# Patient Record
Sex: MALE | Race: WHITE | Employment: FULL TIME | URBAN - METROPOLITAN AREA
[De-identification: names, ages, dates, MRNs, and addresses within clinical notes are randomized per-mention and may not be internally consistent; named-entity substitution may affect disease eponyms.]

---

## 2024-02-01 ENCOUNTER — HOSPITAL ENCOUNTER (OUTPATIENT)
Dept: RADIOLOGY | Facility: HOSPITAL | Age: 49
End: 2024-02-01
Payer: OTHER GOVERNMENT

## 2024-02-12 ENCOUNTER — HOSPITAL ENCOUNTER (OUTPATIENT)
Dept: RADIOLOGY | Facility: HOSPITAL | Age: 49
Discharge: HOME | End: 2024-02-12
Payer: OTHER GOVERNMENT

## 2024-02-12 VITALS
SYSTOLIC BLOOD PRESSURE: 140 MMHG | RESPIRATION RATE: 20 BRPM | HEART RATE: 68 BPM | DIASTOLIC BLOOD PRESSURE: 96 MMHG | OXYGEN SATURATION: 97 %

## 2024-02-12 DIAGNOSIS — I47.20 VENTRICULAR TACHYCARDIA, UNSPECIFIED (MULTI): ICD-10-CM

## 2024-02-12 PROCEDURE — 2500000004 HC RX 250 GENERAL PHARMACY W/ HCPCS (ALT 636 FOR OP/ED): Performed by: STUDENT IN AN ORGANIZED HEALTH CARE EDUCATION/TRAINING PROGRAM

## 2024-02-12 PROCEDURE — 75557 CARDIAC MRI FOR MORPH: CPT | Mod: RSC

## 2024-02-12 RX ORDER — MIDAZOLAM HYDROCHLORIDE 1 MG/ML
0.5 INJECTION, SOLUTION INTRAMUSCULAR; INTRAVENOUS
Status: SHIPPED | OUTPATIENT
Start: 2024-02-12 | End: 2024-02-12

## 2024-02-12 RX ADMIN — MIDAZOLAM 0.5 MG: 1 INJECTION INTRAMUSCULAR; INTRAVENOUS at 10:55

## 2024-02-12 NOTE — NURSING NOTE
1040 Patient received total of Midazolam 2 mg IV for claustrophobia, but is unable to complete MRI scan w/ Midazolam IV sedation; given written instructions, patient will contact PCP to have anesthesia assistance for MRI scan. BL

## 2024-04-05 ENCOUNTER — APPOINTMENT (OUTPATIENT)
Dept: RADIOLOGY | Facility: HOSPITAL | Age: 49
End: 2024-04-05
Payer: OTHER GOVERNMENT

## 2024-04-08 ENCOUNTER — HOSPITAL ENCOUNTER (OUTPATIENT)
Dept: RADIOLOGY | Facility: HOSPITAL | Age: 49
Discharge: HOME | End: 2024-04-08
Payer: OTHER GOVERNMENT

## 2024-04-08 ENCOUNTER — ANESTHESIA (OUTPATIENT)
Dept: RADIOLOGY | Facility: HOSPITAL | Age: 49
End: 2024-04-08
Payer: OTHER GOVERNMENT

## 2024-04-08 ENCOUNTER — ANESTHESIA EVENT (OUTPATIENT)
Dept: RADIOLOGY | Facility: HOSPITAL | Age: 49
End: 2024-04-08
Payer: OTHER GOVERNMENT

## 2024-04-08 VITALS
RESPIRATION RATE: 17 BRPM | TEMPERATURE: 96.8 F | HEART RATE: 72 BPM | DIASTOLIC BLOOD PRESSURE: 82 MMHG | SYSTOLIC BLOOD PRESSURE: 137 MMHG | OXYGEN SATURATION: 96 %

## 2024-04-08 DIAGNOSIS — I47.20 VENTRICULAR TACHYCARDIA, UNSPECIFIED (MULTI): ICD-10-CM

## 2024-04-08 PROBLEM — R07.89 OTHER CHEST PAIN: Status: ACTIVE | Noted: 2024-04-08

## 2024-04-08 PROBLEM — K76.0 FATTY LIVER: Status: ACTIVE | Noted: 2024-04-08

## 2024-04-08 PROBLEM — R94.31 ABNORMAL EKG: Status: ACTIVE | Noted: 2024-04-08

## 2024-04-08 PROBLEM — I63.9 CVA (CEREBRAL VASCULAR ACCIDENT) (MULTI): Status: ACTIVE | Noted: 2024-04-08

## 2024-04-08 PROBLEM — F41.9 ANXIETY: Status: ACTIVE | Noted: 2024-04-08

## 2024-04-08 PROCEDURE — A75561 CHG CARDIAC MRI W/W/O CONTRAST AND FURTHER SEQ

## 2024-04-08 PROCEDURE — 75565 CARD MRI VELOC FLOW MAPPING: CPT | Performed by: INTERNAL MEDICINE

## 2024-04-08 PROCEDURE — A75561 CHG CARDIAC MRI W/W/O CONTRAST AND FURTHER SEQ: Performed by: ANESTHESIOLOGY

## 2024-04-08 PROCEDURE — 3700000001 HC GENERAL ANESTHESIA TIME - INITIAL BASE CHARGE

## 2024-04-08 PROCEDURE — 75561 CARDIAC MRI FOR MORPH W/DYE: CPT | Performed by: INTERNAL MEDICINE

## 2024-04-08 PROCEDURE — A9575 INJ GADOTERATE MEGLUMI 0.1ML: HCPCS

## 2024-04-08 PROCEDURE — 75561 CARDIAC MRI FOR MORPH W/DYE: CPT

## 2024-04-08 PROCEDURE — 2550000001 HC RX 255 CONTRASTS

## 2024-04-08 PROCEDURE — 3700000002 HC GENERAL ANESTHESIA TIME - EACH INCREMENTAL 1 MINUTE

## 2024-04-08 PROCEDURE — 2500000005 HC RX 250 GENERAL PHARMACY W/O HCPCS

## 2024-04-08 PROCEDURE — 7100000001 HC RECOVERY ROOM TIME - INITIAL BASE CHARGE

## 2024-04-08 PROCEDURE — 7100000002 HC RECOVERY ROOM TIME - EACH INCREMENTAL 1 MINUTE

## 2024-04-08 PROCEDURE — 2500000004 HC RX 250 GENERAL PHARMACY W/ HCPCS (ALT 636 FOR OP/ED)

## 2024-04-08 RX ORDER — GADOTERATE MEGLUMINE 376.9 MG/ML
40 INJECTION INTRAVENOUS
Status: CANCELLED | OUTPATIENT
Start: 2024-04-08

## 2024-04-08 RX ORDER — SODIUM CHLORIDE, SODIUM LACTATE, POTASSIUM CHLORIDE, CALCIUM CHLORIDE 600; 310; 30; 20 MG/100ML; MG/100ML; MG/100ML; MG/100ML
100 INJECTION, SOLUTION INTRAVENOUS CONTINUOUS
Status: DISCONTINUED | OUTPATIENT
Start: 2024-04-08 | End: 2024-04-09 | Stop reason: HOSPADM

## 2024-04-08 RX ORDER — ONDANSETRON HYDROCHLORIDE 2 MG/ML
INJECTION, SOLUTION INTRAVENOUS AS NEEDED
Status: DISCONTINUED | OUTPATIENT
Start: 2024-04-08 | End: 2024-04-08

## 2024-04-08 RX ORDER — MIDAZOLAM HYDROCHLORIDE 1 MG/ML
INJECTION INTRAMUSCULAR; INTRAVENOUS AS NEEDED
Status: DISCONTINUED | OUTPATIENT
Start: 2024-04-08 | End: 2024-04-08

## 2024-04-08 RX ORDER — LIDOCAINE HYDROCHLORIDE 20 MG/ML
INJECTION, SOLUTION INFILTRATION; PERINEURAL AS NEEDED
Status: DISCONTINUED | OUTPATIENT
Start: 2024-04-08 | End: 2024-04-08

## 2024-04-08 RX ORDER — ROCURONIUM BROMIDE 10 MG/ML
INJECTION, SOLUTION INTRAVENOUS AS NEEDED
Status: DISCONTINUED | OUTPATIENT
Start: 2024-04-08 | End: 2024-04-08

## 2024-04-08 RX ORDER — GADOTERATE MEGLUMINE 376.9 MG/ML
40 INJECTION INTRAVENOUS
Status: COMPLETED | OUTPATIENT
Start: 2024-04-08 | End: 2024-04-08

## 2024-04-08 RX ORDER — PROPOFOL 10 MG/ML
INJECTION, EMULSION INTRAVENOUS CONTINUOUS PRN
Status: DISCONTINUED | OUTPATIENT
Start: 2024-04-08 | End: 2024-04-08

## 2024-04-08 RX ORDER — PROPOFOL 10 MG/ML
INJECTION, EMULSION INTRAVENOUS AS NEEDED
Status: DISCONTINUED | OUTPATIENT
Start: 2024-04-08 | End: 2024-04-08

## 2024-04-08 RX ORDER — LIDOCAINE IN NACL,ISO-OSMOT/PF 30 MG/3 ML
0.1 SYRINGE (ML) INJECTION ONCE
Status: DISCONTINUED | OUTPATIENT
Start: 2024-04-08 | End: 2024-04-09 | Stop reason: HOSPADM

## 2024-04-08 RX ORDER — FENTANYL CITRATE 50 UG/ML
INJECTION, SOLUTION INTRAMUSCULAR; INTRAVENOUS AS NEEDED
Status: DISCONTINUED | OUTPATIENT
Start: 2024-04-08 | End: 2024-04-08

## 2024-04-08 RX ADMIN — ONDANSETRON 4 MG: 2 INJECTION INTRAMUSCULAR; INTRAVENOUS at 10:50

## 2024-04-08 RX ADMIN — FENTANYL CITRATE 100 MCG: 50 INJECTION, SOLUTION INTRAMUSCULAR; INTRAVENOUS at 09:25

## 2024-04-08 RX ADMIN — SUGAMMADEX 200 MG: 100 INJECTION, SOLUTION INTRAVENOUS at 11:16

## 2024-04-08 RX ADMIN — LIDOCAINE HYDROCHLORIDE 100 MG: 20 INJECTION, SOLUTION INFILTRATION; PERINEURAL at 09:25

## 2024-04-08 RX ADMIN — ROCURONIUM BROMIDE 50 MG: 10 INJECTION INTRAVENOUS at 09:26

## 2024-04-08 RX ADMIN — MIDAZOLAM HYDROCHLORIDE 2 MG: 1 INJECTION, SOLUTION INTRAMUSCULAR; INTRAVENOUS at 09:23

## 2024-04-08 RX ADMIN — GADOTERATE MEGLUMINE 36 ML: 376.9 INJECTION INTRAVENOUS at 11:55

## 2024-04-08 RX ADMIN — SODIUM CHLORIDE, SODIUM LACTATE, POTASSIUM CHLORIDE, AND CALCIUM CHLORIDE: 600; 310; 30; 20 INJECTION, SOLUTION INTRAVENOUS at 09:23

## 2024-04-08 RX ADMIN — PROPOFOL 200 MG: 10 INJECTION, EMULSION INTRAVENOUS at 09:25

## 2024-04-08 RX ADMIN — PROPOFOL 150 MCG/KG/MIN: 10 INJECTION, EMULSION INTRAVENOUS at 09:32

## 2024-04-08 RX ADMIN — SUGAMMADEX 200 MG: 100 INJECTION, SOLUTION INTRAVENOUS at 11:12

## 2024-04-08 ASSESSMENT — PAIN - FUNCTIONAL ASSESSMENT
PAIN_FUNCTIONAL_ASSESSMENT: 0-10

## 2024-04-08 ASSESSMENT — PAIN SCALES - GENERAL
PAINLEVEL_OUTOF10: 0 - NO PAIN

## 2024-04-08 NOTE — ANESTHESIA PROCEDURE NOTES
Airway  Date/Time: 4/8/2024 9:28 AM  Urgency: elective    Airway not difficult    Staffing  Performed: CRNA   Authorized by: Bhumika Fan MD    Performed by: AURELIA Moore  Patient location during procedure: OR    Indications and Patient Condition  Indications for airway management: anesthesia  Spontaneous Ventilation: absent  Sedation level: deep  Preoxygenated: yes  Patient position: sniffing  Mask difficulty assessment: 2 - vent by mask + OA or adjuvant +/- NMBA  Planned trial extubation    Final Airway Details  Final airway type: endotracheal airway      Successful airway: ETT  Cuffed: yes   Successful intubation technique: direct laryngoscopy  Facilitating devices/methods: anterior pressure/BURP  Blade: Levi  Blade size: #4  ETT size (mm): 7.5  Cormack-Lehane Classification: grade IIb - view of arytenoids or posterior of glottis only  Placement verified by: chest auscultation and capnometry   Measured from: teeth  ETT to teeth (cm): 22  Number of attempts at approach: 1    Additional Comments  Lips and teeth in preop condition

## 2024-04-08 NOTE — ANESTHESIA PREPROCEDURE EVALUATION
Patient: Sharan Jimenez    Procedure Information       Date/Time: 04/08/24 0830    Scheduled providers: Bhumika Fan MD    Procedure: MR CARDIAC MORPHOLOGY AND FUNCTION W AND WO IV CONTRAST    Location: [facility]            Relevant Problems   Cardiac   (+) Abnormal EKG   (+) Other chest pain   (+) Ventricular tachycardia (CMS/HCC)      Neuro   (+) Anxiety   (+) CVA (cerebral vascular accident) (CMS/HCC)      GI (within normal limits)      Liver   (+) Fatty liver      Endocrine (within normal limits)      Hematology (within normal limits)       Clinical information reviewed:                   NPO Detail:  No data recorded     Physical Exam    Airway  Mallampati: II  TM distance: >3 FB  Neck ROM: full     Cardiovascular    Dental    Pulmonary    Abdominal        Anesthesia Plan    History of general anesthesia?: yes  History of complications of general anesthesia?: no    ASA 3     general   (GA ETT)  intravenous induction   Anesthetic plan and risks discussed with patient.  Use of blood products discussed with patient who consented to blood products.

## 2024-04-08 NOTE — ANESTHESIA POSTPROCEDURE EVALUATION
Patient: Sharan Jimenez    Procedure Summary       Date: 04/08/24 Room / Location: [facility]    Anesthesia Start: 0923 Anesthesia Stop: 1137    Procedure: MR CARDIAC MORPHOLOGY AND FUNCTION W AND WO IV CONTRAST Diagnosis:       Ventricular tachycardia, unspecified (CMS/HCC)      (Ventricular tachycardia)    Scheduled Providers: Bhumika Fan MD Responsible Provider: Bhumika Fan MD    Anesthesia Type: general ASA Status: 3            Anesthesia Type: general    Vitals Value Taken Time   /88 04/08/24 1200   Temp 36.1 °C (97 °F) 04/08/24 1200   Pulse 71 04/08/24 1200   Resp 17 04/08/24 1200   SpO2 95 % 04/08/24 1200       Anesthesia Post Evaluation    Patient location during evaluation: PACU  Patient participation: complete - patient participated  Level of consciousness: awake and alert  Pain management: satisfactory to patient  Airway patency: patent  Cardiovascular status: acceptable  Respiratory status: acceptable  Hydration status: acceptable  Postoperative Nausea and Vomiting: none    There were no known notable events for this encounter.

## 2024-11-18 ENCOUNTER — OFFICE VISIT (OUTPATIENT)
Dept: URGENT CARE | Age: 49
End: 2024-11-18
Payer: COMMERCIAL

## 2024-11-18 VITALS
TEMPERATURE: 97.5 F | OXYGEN SATURATION: 96 % | BODY MASS INDEX: 36.16 KG/M2 | SYSTOLIC BLOOD PRESSURE: 114 MMHG | RESPIRATION RATE: 16 BRPM | DIASTOLIC BLOOD PRESSURE: 78 MMHG | HEART RATE: 80 BPM | WEIGHT: 252 LBS

## 2024-11-18 DIAGNOSIS — J02.9 SORE THROAT: ICD-10-CM

## 2024-11-18 LAB
POC HUMAN RHINOVIRUS PCR: NEGATIVE
POC INFLUENZA A VIRUS PCR: NEGATIVE
POC INFLUENZA B VIRUS PCR: NEGATIVE
POC RESPIRATORY SYNCYTIAL VIRUS PCR: NEGATIVE
POC STREPTOCOCCUS PYOGENES (GROUP A STREP) PCR: NEGATIVE

## 2024-11-18 PROCEDURE — 1036F TOBACCO NON-USER: CPT | Performed by: FAMILY MEDICINE

## 2024-11-18 PROCEDURE — 99203 OFFICE O/P NEW LOW 30 MIN: CPT | Performed by: FAMILY MEDICINE

## 2024-11-18 PROCEDURE — 87631 RESP VIRUS 3-5 TARGETS: CPT | Performed by: FAMILY MEDICINE

## 2024-11-18 RX ORDER — ALBUTEROL SULFATE 90 UG/1
INHALANT RESPIRATORY (INHALATION)
COMMUNITY
Start: 2024-02-05

## 2024-11-18 NOTE — PATIENT INSTRUCTIONS
Chloraseptic spray or lozenges as needed  Mucinex D or other decongestants as needed  Gargle with lightly salted water several times a day  Motrin or Tylenol as needed  Increase fluids and rest  Follow-up if symptoms worsen

## 2024-11-18 NOTE — PROGRESS NOTES
Subjective   Patient ID: Sharan Jimenez is a 49 y.o. male. They present today with a chief complaint of Sore Throat (//////////////////////////////////////////////////////////////////////////////////////////////////////////////////////////////////////Rt ear pain x 5 days/////).    History of Present Illness  HPI  5 Days of sore throat and right ear pain. Mild nasal congestion with postnasal drip. No fevers or chills. No eye redness, discharge or itching. No nausea vomiting or diarrhea. No chest pain, shortness of breath or wheezing noted. No rashes or skin lesions noted. No known exposures to Mono, strep, pneumonia or influenza. Over-the-counter medications taken for symptoms. Nonsmoker.    Past Medical History  Allergies as of 11/18/2024 - Reviewed 11/18/2024   Allergen Reaction Noted    Cat dander Shortness of breath and Other 03/03/2008    Dog dander Other and Shortness of breath 03/03/2008    Grass pollen Shortness of breath and Other 03/03/2008    House dust Shortness of breath and Other 10/21/2010    Mold Shortness of breath 03/03/2008    Pollen extracts Other 07/30/2023    Wheat bran Unknown 03/03/2008       (Not in a hospital admission)       No past medical history on file.    No past surgical history on file.     reports that he has never smoked. He has never used smokeless tobacco.    Review of Systems  Review of Systems    As in history of present illness                        Objective    Vitals:    11/18/24 0808   BP: 114/78   Pulse: 80   Resp: 16   Temp: 36.4 °C (97.5 °F)   SpO2: 96%   Weight: 114 kg (252 lb)     No LMP for male patient.    Physical Exam  Gen- A&O. NAD at rest. Swallowing appears uncomfortable  Ears- canals and TM's appear normal  OP- mildly erythema without exudates. Some mucous in posterier OP   Neck- mild anterior lymphadenopathy  Lungs- clear to auscultaion without wheezes or rhonchi  Skin-no rashes, hives or lesions  Procedures    Point of Care Test & Imaging Results from this  visit  Results for orders placed or performed in visit on 11/18/24   POCT SPOTFIRE R/ST Panel Mini w/Strep A ([facility]) manually resulted   Result Value Ref Range    POC Group A Strep, PCR Negative Negative    POC Respiratory Syncytial Virus PCR Negative Negative    POC Influenza A Virus PCR Negative Negative    POC Influenza B Virus PCR Negative Negative    POC Human Rhinovirus PCR Negative Negative      No results found.    Diagnostic study results (if any) were reviewed by Tl Ruano MD.    Assessment/Plan   Allergies, medications, history, and pertinent labs/EKGs/Imaging reviewed by Tl Ruano MD.     Medical Decision Making  At time of discharge patient was clinically well-appearing and HDS for outpatient management. The patient and/or family was educated regarding diagnosis, supportive care, OTC and Rx medications. The patient and/or family was given the opportunity to ask questions prior to discharge.  They verbalized understanding of my discussion of the plans for treatment, expected course, indications to return to  or seek further evaluation in ED, and the need for timely follow up as directed.   They were provided with a work/school excuse if requested.    Orders and Diagnoses  Diagnoses and all orders for this visit:  Sore throat  -     POCT SPOTFIRE R/ST Panel Mini w/Strep A ([facility]) manually resulted      Medical Admin Record      Patient disposition: Home    Electronically signed by Tl Ruano MD  8:42 AM